# Patient Record
Sex: FEMALE | Race: WHITE | NOT HISPANIC OR LATINO | URBAN - METROPOLITAN AREA
[De-identification: names, ages, dates, MRNs, and addresses within clinical notes are randomized per-mention and may not be internally consistent; named-entity substitution may affect disease eponyms.]

---

## 2020-01-31 ENCOUNTER — OFFICE (OUTPATIENT)
Dept: URBAN - METROPOLITAN AREA CLINIC 78 | Facility: CLINIC | Age: 70
End: 2020-01-31
Payer: MEDICAID

## 2020-01-31 VITALS
TEMPERATURE: 97.6 F | HEART RATE: 85 BPM | HEIGHT: 62 IN | SYSTOLIC BLOOD PRESSURE: 138 MMHG | WEIGHT: 117 LBS | DIASTOLIC BLOOD PRESSURE: 93 MMHG

## 2020-01-31 DIAGNOSIS — Z43.1 ENCOUNTER FOR ATTENTION TO GASTROSTOMY: ICD-10-CM

## 2020-01-31 DIAGNOSIS — K21.0 GASTRO-ESOPHAGEAL REFLUX DISEASE WITH ESOPHAGITIS: ICD-10-CM

## 2020-01-31 PROCEDURE — 99204 OFFICE O/P NEW MOD 45 MIN: CPT

## 2020-02-21 ENCOUNTER — ON CAMPUS - OUTPATIENT (OUTPATIENT)
Dept: URBAN - METROPOLITAN AREA HOSPITAL 35 | Facility: HOSPITAL | Age: 70
End: 2020-02-21

## 2020-02-21 ENCOUNTER — ON CAMPUS - OUTPATIENT (OUTPATIENT)
Dept: URBAN - METROPOLITAN AREA HOSPITAL 35 | Facility: HOSPITAL | Age: 70
End: 2020-02-21
Payer: MEDICAID

## 2020-02-21 DIAGNOSIS — R05 COUGH: ICD-10-CM

## 2020-02-21 DIAGNOSIS — K21.9 GASTRO-ESOPHAGEAL REFLUX DISEASE WITHOUT ESOPHAGITIS: ICD-10-CM

## 2020-02-21 DIAGNOSIS — K22.2 ESOPHAGEAL OBSTRUCTION: ICD-10-CM

## 2020-02-21 PROCEDURE — 00002: CPT

## 2020-02-21 PROCEDURE — 43248 EGD GUIDE WIRE INSERTION: CPT

## 2020-10-01 ENCOUNTER — OFFICE (OUTPATIENT)
Dept: URBAN - METROPOLITAN AREA CLINIC 34 | Facility: CLINIC | Age: 70
End: 2020-10-01
Payer: MEDICAID

## 2020-10-01 VITALS
HEIGHT: 62 IN | HEART RATE: 83 BPM | SYSTOLIC BLOOD PRESSURE: 155 MMHG | WEIGHT: 117 LBS | DIASTOLIC BLOOD PRESSURE: 96 MMHG | TEMPERATURE: 97.7 F

## 2020-10-01 DIAGNOSIS — R93.2 ABNORMAL FINDINGS ON DIAGNOSTIC IMAGING OF LIVER AND BILIARY: ICD-10-CM

## 2020-10-01 DIAGNOSIS — R19.8 OTHER SPECIFIED SYMPTOMS AND SIGNS INVOLVING THE DIGESTIVE S: ICD-10-CM

## 2020-10-01 DIAGNOSIS — Z12.11 ENCOUNTER FOR SCREENING FOR MALIGNANT NEOPLASM OF COLON: ICD-10-CM

## 2020-10-01 PROCEDURE — 99214 OFFICE O/P EST MOD 30 MIN: CPT | Performed by: NURSE PRACTITIONER

## 2020-10-01 NOTE — SERVICEHPINOTES
PAULINE VEGA   is a   70  female who present with above findings. Blood work from 09/11/20 showed AST 26, ALT 68, , total bili 0.2, negative for MARY, ASMA and AMA, no evidence of chronic hep B or C.BRU/S of liver showed mildly coarsened liver parenchyma suggesting hepatocellular disease. BRNot sure if had a hx of elevated liver functions. She is here with her daughter, the pt used to live somewhere else and she has been living with her for a year and 5 months. She has been eating okay. Denies nausea, vomiting, dysphagia or stomach pain. BR+ Constipation. Takes Miralax prn. Moves bowel up to twice daily. Denies blood in stool, melena or weight loss. Abdominal pain with constipation. Denies hx of drinking. BRDenies family hx of colon cancer. Pt's last colonoscopy was approx. 30 years ago. BRDenise hx of cardiovascular disease. BR BR

## 2020-10-07 ENCOUNTER — OFFICE (OUTPATIENT)
Dept: URBAN - METROPOLITAN AREA CLINIC 102 | Facility: CLINIC | Age: 70
End: 2020-10-07
Payer: MEDICAID

## 2020-10-07 DIAGNOSIS — R19.8 OTHER SPECIFIED SYMPTOMS AND SIGNS INVOLVING THE DIGESTIVE S: ICD-10-CM

## 2020-10-07 PROCEDURE — 91200 LIVER ELASTOGRAPHY: CPT | Performed by: INTERNAL MEDICINE

## 2021-01-07 ENCOUNTER — OFFICE (OUTPATIENT)
Dept: URBAN - METROPOLITAN AREA CLINIC 34 | Facility: CLINIC | Age: 71
End: 2021-01-07

## 2021-01-07 VITALS
HEIGHT: 62 IN | TEMPERATURE: 97.7 F | WEIGHT: 122 LBS | DIASTOLIC BLOOD PRESSURE: 83 MMHG | SYSTOLIC BLOOD PRESSURE: 132 MMHG | HEART RATE: 85 BPM

## 2021-01-07 DIAGNOSIS — R93.2 ABNORMAL FINDINGS ON DIAGNOSTIC IMAGING OF LIVER AND BILIARY: ICD-10-CM

## 2021-01-07 DIAGNOSIS — Z12.11 ENCOUNTER FOR SCREENING FOR MALIGNANT NEOPLASM OF COLON: ICD-10-CM

## 2021-01-07 DIAGNOSIS — R19.8 OTHER SPECIFIED SYMPTOMS AND SIGNS INVOLVING THE DIGESTIVE S: ICD-10-CM

## 2021-01-07 PROCEDURE — 99214 OFFICE O/P EST MOD 30 MIN: CPT | Performed by: NURSE PRACTITIONER

## 2021-01-07 NOTE — SERVICEHPINOTES
PAULINE VEGA   is a   70  female who presents for follow up.  PT seen here in Oct since 2020 with elevated LFTs. BRDenies nausea, vomiting, dysphagia, stomach pain or weight loss. BRMoves bowel 1-2 a week. + Constipation. Takes miralax prn. Denies blood in stool, melena or lower abdominal pain.No family hx of colon cancer. BRFamily hx of high cholesterol. Sees cardiologist at [facility]. BR BR

## 2021-07-20 ENCOUNTER — OFFICE (OUTPATIENT)
Dept: URBAN - METROPOLITAN AREA TELEHEALTH 12 | Facility: TELEHEALTH | Age: 71
End: 2021-07-20
Payer: MEDICAID

## 2021-07-20 VITALS — HEIGHT: 62 IN | WEIGHT: 125 LBS

## 2021-07-20 DIAGNOSIS — R93.2 ABNORMAL FINDINGS ON DIAGNOSTIC IMAGING OF LIVER AND BILIARY: ICD-10-CM

## 2021-07-20 DIAGNOSIS — R19.8 OTHER SPECIFIED SYMPTOMS AND SIGNS INVOLVING THE DIGESTIVE S: ICD-10-CM

## 2021-07-20 PROCEDURE — 99214 OFFICE O/P EST MOD 30 MIN: CPT | Mod: 95 | Performed by: NURSE PRACTITIONER

## 2021-07-20 NOTE — SERVICEHPINOTES
PATIENT VERIFIED BY DATE OF BIRTH AND NAME. Patient has been consented for this telecommunication visit. Has been doing well. Had a recent blood work by pcp, no records to review at this time. BRDenies abdominal pain, nausea, vomiting or dysphagia. Lost about 6 lbs within 3-4 months. Has a good appetite. BRBMs are irregular with constipation. Denies blood in stool, melena, diarrhea or lower abdominal pain. PCP will send the cologuard DNA. ROS as stated above, otherwise negative. BR BR